# Patient Record
Sex: FEMALE | URBAN - METROPOLITAN AREA
[De-identification: names, ages, dates, MRNs, and addresses within clinical notes are randomized per-mention and may not be internally consistent; named-entity substitution may affect disease eponyms.]

---

## 2024-10-22 ENCOUNTER — APPOINTMENT (RX ONLY)
Dept: URBAN - METROPOLITAN AREA CLINIC 89 | Facility: CLINIC | Age: 34
Setting detail: DERMATOLOGY
End: 2024-10-22

## 2024-10-22 DIAGNOSIS — L40.0 PSORIASIS VULGARIS: ICD-10-CM

## 2024-10-22 DIAGNOSIS — L90.8 OTHER ATROPHIC DISORDERS OF SKIN: ICD-10-CM

## 2024-10-22 DIAGNOSIS — B36.0 PITYRIASIS VERSICOLOR: ICD-10-CM

## 2024-10-22 PROCEDURE — ? COSMETIC CONSULTATION - MICRO-NEEDLING

## 2024-10-22 PROCEDURE — ? OTC TREATMENT REGIMEN

## 2024-10-22 PROCEDURE — 99214 OFFICE O/P EST MOD 30 MIN: CPT

## 2024-10-22 PROCEDURE — ? ADDITIONAL NOTES

## 2024-10-22 PROCEDURE — ? TREATMENT REGIMEN

## 2024-10-22 PROCEDURE — ? PRESCRIPTION MEDICATION MANAGEMENT

## 2024-10-22 PROCEDURE — ? COUNSELING

## 2024-10-22 PROCEDURE — ? PRESCRIPTION

## 2024-10-22 PROCEDURE — ? TREMFYA MONITORING

## 2024-10-22 RX ORDER — KETOCONAZOLE 20 MG/G
% CREAM TOPICAL BID
Qty: 60 | Refills: 1 | Status: ERX | COMMUNITY
Start: 2024-10-22

## 2024-10-22 RX ADMIN — KETOCONAZOLE 1: 20 CREAM TOPICAL at 00:00

## 2024-10-22 ASSESSMENT — SEVERITY ASSESSMENT: SEVERITY: MILD TO MODERATE

## 2024-10-22 ASSESSMENT — LOCATION ZONE DERM
LOCATION ZONE: TRUNK
LOCATION ZONE: ARM

## 2024-10-22 ASSESSMENT — LOCATION SIMPLE DESCRIPTION DERM
LOCATION SIMPLE: LEFT UPPER BACK
LOCATION SIMPLE: LEFT SHOULDER

## 2024-10-22 ASSESSMENT — LOCATION DETAILED DESCRIPTION DERM
LOCATION DETAILED: LEFT SUPERIOR UPPER BACK
LOCATION DETAILED: LEFT ANTERIOR SHOULDER
LOCATION DETAILED: LEFT POSTERIOR SHOULDER

## 2024-10-22 ASSESSMENT — BSA RASH: BSA RASH: 5

## 2024-10-22 ASSESSMENT — PGA PSORIASIS: PGA PSORIASIS 2020: MILD

## 2024-10-22 ASSESSMENT — ITCH NUMERIC RATING SCALE: HOW SEVERE IS YOUR ITCHING?: 5

## 2024-10-22 ASSESSMENT — BSA PSORIASIS: % BODY COVERED IN PSORIASIS: 5

## 2024-10-22 NOTE — PROCEDURE: PRESCRIPTION MEDICATION MANAGEMENT
Modify Regimen: As needed:\\n\\nZoryve 0.03% QD\\nApply thin layer to affected areas on shoulders and back 1x daily.
Render In Strict Bullet Format?: No
Detail Level: Zone
Initiate Treatment: ketoconazole 2 % topical cream BID\\nSig: Apply a thin layer to affected areas on L shoulder 2x a day for 4 weeks

## 2024-10-22 NOTE — PROCEDURE: ADDITIONAL NOTES
Additional Notes: Patient was switched from Humira to Tremfya by Rheumatology.
Detail Level: Simple
Render Risk Assessment In Note?: no

## 2024-10-22 NOTE — PROCEDURE: TREMFYA MONITORING
Detail Level: Zone
Add High Risk Medication Management Associated Diagnosis?: No
Length Of Therapy: 1 month
Anesthesia Volume In Cc (Will Not Render If 0): 0.5

## 2024-12-18 ENCOUNTER — APPOINTMENT (OUTPATIENT)
Dept: URBAN - METROPOLITAN AREA CLINIC 89 | Facility: CLINIC | Age: 34
Setting detail: DERMATOLOGY
End: 2024-12-18

## 2024-12-18 DIAGNOSIS — B36.0 PITYRIASIS VERSICOLOR: ICD-10-CM | Status: RESOLVED

## 2024-12-18 DIAGNOSIS — L40.0 PSORIASIS VULGARIS: ICD-10-CM | Status: IMPROVED

## 2024-12-18 PROCEDURE — ? TREATMENT REGIMEN

## 2024-12-18 PROCEDURE — ? PRESCRIPTION MEDICATION MANAGEMENT

## 2024-12-18 PROCEDURE — 99214 OFFICE O/P EST MOD 30 MIN: CPT

## 2024-12-18 PROCEDURE — ? COUNSELING

## 2024-12-18 PROCEDURE — ? ADDITIONAL NOTES

## 2024-12-18 ASSESSMENT — BSA PSORIASIS: % BODY COVERED IN PSORIASIS: 0

## 2024-12-18 ASSESSMENT — LOCATION DETAILED DESCRIPTION DERM
LOCATION DETAILED: LEFT ANTERIOR SHOULDER
LOCATION DETAILED: LEFT POSTERIOR SHOULDER
LOCATION DETAILED: LEFT SUPERIOR UPPER BACK

## 2024-12-18 ASSESSMENT — PGA PSORIASIS: PGA PSORIASIS 2020: CLEAR

## 2024-12-18 ASSESSMENT — LOCATION SIMPLE DESCRIPTION DERM
LOCATION SIMPLE: LEFT SHOULDER
LOCATION SIMPLE: LEFT UPPER BACK

## 2024-12-18 ASSESSMENT — LOCATION ZONE DERM
LOCATION ZONE: ARM
LOCATION ZONE: TRUNK

## 2024-12-18 ASSESSMENT — ITCH NUMERIC RATING SCALE: HOW SEVERE IS YOUR ITCHING?: 0

## 2024-12-18 NOTE — PROCEDURE: PRESCRIPTION MEDICATION MANAGEMENT
Modify Regimen: As needed:\\n\\nZoryve 0.03% QD\\nApply thin layer to affected areas on shoulders and back 1x daily.
Render In Strict Bullet Format?: No
Detail Level: Zone
Modify Regimen: As needed:\\n\\nketoconazole 2 % topical cream BID\\nSig: Apply a thin layer to affected areas on L shoulder 2x a day for 4 weeks

## 2024-12-27 ENCOUNTER — APPOINTMENT (OUTPATIENT)
Dept: URBAN - METROPOLITAN AREA CLINIC 89 | Facility: CLINIC | Age: 34
Setting detail: DERMATOLOGY
End: 2024-12-27

## 2024-12-27 DIAGNOSIS — Q826 OTHER SPECIFIED ANOMALIES OF SKIN: ICD-10-CM

## 2024-12-27 DIAGNOSIS — Q819 OTHER SPECIFIED ANOMALIES OF SKIN: ICD-10-CM

## 2024-12-27 DIAGNOSIS — Q828 OTHER SPECIFIED ANOMALIES OF SKIN: ICD-10-CM

## 2024-12-27 PROBLEM — L85.8 OTHER SPECIFIED EPIDERMAL THICKENING: Status: ACTIVE | Noted: 2024-12-27

## 2024-12-27 PROCEDURE — ? PRESCRIPTION MEDICATION MANAGEMENT

## 2024-12-27 PROCEDURE — ? COUNSELING

## 2024-12-27 PROCEDURE — ? PRESCRIPTION

## 2024-12-27 PROCEDURE — 99214 OFFICE O/P EST MOD 30 MIN: CPT

## 2024-12-27 RX ORDER — BETAMETHASONE DIPROPIONATE 0.5 MG/ML
% LOTION TOPICAL BID
Qty: 60 | Refills: 2 | Status: ERX | COMMUNITY
Start: 2024-12-27

## 2024-12-27 RX ADMIN — BETAMETHASONE DIPROPIONATE %: 0.5 LOTION TOPICAL at 00:00

## 2024-12-27 ASSESSMENT — LOCATION SIMPLE DESCRIPTION DERM
LOCATION SIMPLE: LEFT BUTTOCK
LOCATION SIMPLE: LEFT BUTTOCK

## 2024-12-27 ASSESSMENT — SEVERITY ASSESSMENT: SEVERITY: MILD TO MODERATE

## 2024-12-27 ASSESSMENT — LOCATION ZONE DERM
LOCATION ZONE: TRUNK
LOCATION ZONE: TRUNK

## 2024-12-27 ASSESSMENT — LOCATION DETAILED DESCRIPTION DERM
LOCATION DETAILED: LEFT BUTTOCK
LOCATION DETAILED: LEFT BUTTOCK

## 2024-12-27 NOTE — PROCEDURE: PRESCRIPTION MEDICATION MANAGEMENT
Continue Regimen: AmLactin
Initiate Treatment: betamethasone dipropionate 0.05 % lotion BID-Sig: Apply to affected area on buttocks twice a day for two weeks and one week break
Render In Strict Bullet Format?: No
Detail Level: Zone
Plan: Recommend Accutane for better results. Patient is currently under laser treatment and would like to hold off until she completes laser treatment.

## 2024-12-27 NOTE — PROCEDURE: MIPS QUALITY
Quality 47: Advance Care Plan: Advance care planning not documented, reason not otherwise specified.
Quality 431: Preventive Care And Screening: Unhealthy Alcohol Use - Screening: Patient not identified as an unhealthy alcohol user when screened for unhealthy alcohol use using a systematic screening method
Quality 130: Documentation Of Current Medications In The Medical Record: Current Medications Documented
Detail Level: Detailed
Quality 226: Preventive Care And Screening: Tobacco Use: Screening And Cessation Intervention: Patient screened for tobacco use and is an ex/non-smoker
[id number]

## 2025-02-20 ENCOUNTER — APPOINTMENT (OUTPATIENT)
Dept: URBAN - METROPOLITAN AREA CLINIC 89 | Facility: CLINIC | Age: 35
Setting detail: DERMATOLOGY
End: 2025-02-20

## 2025-02-20 DIAGNOSIS — Q828 OTHER SPECIFIED ANOMALIES OF SKIN: ICD-10-CM

## 2025-02-20 DIAGNOSIS — K13.0 DISEASES OF LIPS: ICD-10-CM

## 2025-02-20 DIAGNOSIS — Q826 OTHER SPECIFIED ANOMALIES OF SKIN: ICD-10-CM

## 2025-02-20 DIAGNOSIS — L73.9 FOLLICULAR DISORDER, UNSPECIFIED: ICD-10-CM

## 2025-02-20 DIAGNOSIS — L40.8 OTHER PSORIASIS: ICD-10-CM

## 2025-02-20 DIAGNOSIS — Q819 OTHER SPECIFIED ANOMALIES OF SKIN: ICD-10-CM

## 2025-02-20 PROBLEM — L85.8 OTHER SPECIFIED EPIDERMAL THICKENING: Status: ACTIVE | Noted: 2025-02-20

## 2025-02-20 PROBLEM — L02.32 FURUNCLE OF BUTTOCK: Status: ACTIVE | Noted: 2025-02-20

## 2025-02-20 PROCEDURE — ? PRESCRIPTION

## 2025-02-20 PROCEDURE — 99213 OFFICE O/P EST LOW 20 MIN: CPT

## 2025-02-20 PROCEDURE — ? PRESCRIPTION MEDICATION MANAGEMENT

## 2025-02-20 PROCEDURE — ? TREATMENT REGIMEN

## 2025-02-20 PROCEDURE — ? COUNSELING

## 2025-02-20 RX ORDER — MUPIROCIN 20 MG/G
THIN LAYER OINTMENT TOPICAL BID
Qty: 22 | Refills: 0 | Status: ERX | COMMUNITY
Start: 2025-02-20

## 2025-02-20 RX ORDER — MOMETASONE FUROATE 1 MG/ML
ONE DROP SOLUTION TOPICAL QD
Qty: 60 | Refills: 0 | Status: ERX | COMMUNITY
Start: 2025-02-20

## 2025-02-20 RX ORDER — CLINDAMYCIN PHOSPHATE 10 MG/ML
THIN LAYER LOTION TOPICAL BID
Qty: 60 | Refills: 6 | Status: ERX | COMMUNITY
Start: 2025-02-20

## 2025-02-20 RX ADMIN — CLINDAMYCIN PHOSPHATE THIN LAYER: 10 LOTION TOPICAL at 00:00

## 2025-02-20 RX ADMIN — MUPIROCIN THIN LAYER: 20 OINTMENT TOPICAL at 00:00

## 2025-02-20 RX ADMIN — MOMETASONE FUROATE ONE DROP: 1 SOLUTION TOPICAL at 00:00

## 2025-02-20 ASSESSMENT — LOCATION DETAILED DESCRIPTION DERM
LOCATION DETAILED: LEFT DORSAL GREAT TOE
LOCATION DETAILED: COLUMELLA
LOCATION DETAILED: LEFT INDEX FINGERNAIL
LOCATION DETAILED: LEFT INDEX FINGERNAIL
LOCATION DETAILED: LEFT BUTTOCK
LOCATION DETAILED: RIGHT BUTTOCK

## 2025-02-20 ASSESSMENT — LOCATION ZONE DERM
LOCATION ZONE: FINGERNAIL
LOCATION ZONE: NOSE
LOCATION ZONE: TRUNK
LOCATION ZONE: TOE
LOCATION ZONE: FINGERNAIL

## 2025-02-20 ASSESSMENT — LOCATION SIMPLE DESCRIPTION DERM
LOCATION SIMPLE: LEFT INDEX FINGERNAIL
LOCATION SIMPLE: LEFT GREAT TOE
LOCATION SIMPLE: LEFT BUTTOCK
LOCATION SIMPLE: RIGHT BUTTOCK
LOCATION SIMPLE: NOSE
LOCATION SIMPLE: LEFT INDEX FINGERNAIL

## 2025-02-20 NOTE — PROCEDURE: PRESCRIPTION MEDICATION MANAGEMENT
Initiate Treatment: mometasone 0.1 % topical solution QD\\nQuantity: 60.0 ml  Days Supply: 30\\nSi drop to affected nails for 1 week
Detail Level: Zone
Continue Regimen: Zoryve after the 1 week of mometasone drops
Render In Strict Bullet Format?: No
Plan: Wait until full 16 weeks on Biologic. F/U in 8 weeks
Initiate Treatment: mupirocin 2 % topical ointment BID\\nQuantity: 22.0 g  Days Supply: 30\\nSig: Apply thin layer to affected areas 2 times daily for 2 weeks.
Initiate Treatment: clindamycin 1 % lotion BID\\nQuantity: 60.0 ml  Days Supply: 30\\nSig: Apply a thin layer to affected areas twice a day for 4 weeks, repeat as needed for flares

## 2025-05-15 ENCOUNTER — APPOINTMENT (OUTPATIENT)
Dept: URBAN - METROPOLITAN AREA CLINIC 89 | Facility: CLINIC | Age: 35
Setting detail: DERMATOLOGY
End: 2025-05-15

## 2025-05-15 DIAGNOSIS — L71.0 PERIORAL DERMATITIS: ICD-10-CM

## 2025-05-15 PROCEDURE — ? COUNSELING

## 2025-05-15 PROCEDURE — ? PRESCRIPTION

## 2025-05-15 PROCEDURE — 99213 OFFICE O/P EST LOW 20 MIN: CPT

## 2025-05-15 PROCEDURE — ? PRESCRIPTION MEDICATION MANAGEMENT

## 2025-05-15 PROCEDURE — ? DIAGNOSIS COMMENT

## 2025-05-15 PROCEDURE — ? PHOTO-DOCUMENTATION

## 2025-05-15 RX ORDER — TACROLIMUS 1 MG/G
OINTMENT TOPICAL QD
Qty: 60 | Refills: 1 | Status: ERX | COMMUNITY
Start: 2025-05-15

## 2025-05-15 RX ORDER — CLINDAMYCIN PHOSPHATE 10 MG/ML
LOTION TOPICAL BID
Qty: 60 | Refills: 1 | Status: ERX

## 2025-05-15 RX ADMIN — TACROLIMUS: 1 OINTMENT TOPICAL at 00:00

## 2025-05-15 ASSESSMENT — LOCATION SIMPLE DESCRIPTION DERM
LOCATION SIMPLE: LEFT LIP
LOCATION SIMPLE: LEFT NOSE

## 2025-05-15 ASSESSMENT — LOCATION ZONE DERM
LOCATION ZONE: LIP
LOCATION ZONE: NOSE

## 2025-05-15 ASSESSMENT — LOCATION DETAILED DESCRIPTION DERM
LOCATION DETAILED: LEFT NASAL ALA
LOCATION DETAILED: LEFT LOWER CUTANEOUS LIP

## 2025-05-15 NOTE — PROCEDURE: PRESCRIPTION MEDICATION MANAGEMENT
Detail Level: Zone
Render In Strict Bullet Format?: No
Initiate Treatment: clindamycin 1 % lotion BID\\nQuantity: 60.0 ml  Days Supply: 30\\nSig: Apply a thin layer to affected areas of perioral derm 1x a day for 4 weeks in the AM.\\n\\ntacrolimus 0.1 % topical ointment Qd\\nQuantity: 60.0 g  Days Supply: 30\\nSig: Apply a thin layer to affected areas of perioral derm once a day in the PM x 4 weeks

## 2025-05-15 NOTE — PROCEDURE: DIAGNOSIS COMMENT
Comment: Due to distribution: Ddx includes - Herpes Zoster
Render Risk Assessment In Note?: no
Detail Level: Simple

## 2025-06-11 ENCOUNTER — APPOINTMENT (OUTPATIENT)
Dept: URBAN - METROPOLITAN AREA CLINIC 89 | Facility: CLINIC | Age: 35
Setting detail: DERMATOLOGY
End: 2025-06-11

## 2025-06-11 DIAGNOSIS — L71.0 PERIORAL DERMATITIS: ICD-10-CM

## 2025-06-11 DIAGNOSIS — L40.0 PSORIASIS VULGARIS: ICD-10-CM | Status: WELL CONTROLLED

## 2025-06-11 PROCEDURE — ? PRESCRIPTION

## 2025-06-11 PROCEDURE — ? PRESCRIPTION MEDICATION MANAGEMENT

## 2025-06-11 PROCEDURE — ? DIAGNOSIS COMMENT

## 2025-06-11 PROCEDURE — ? COUNSELING

## 2025-06-11 RX ORDER — ROFLUMILAST 3 MG/G
CREAM TOPICAL QD
Qty: 60 | Refills: 3 | Status: ERX

## 2025-06-11 RX ORDER — BETAMETHASONE DIPROPIONATE 0.5 MG/G
CREAM TOPICAL
Qty: 45 | Refills: 0 | Status: ERX

## 2025-06-11 ASSESSMENT — LOCATION ZONE DERM
LOCATION ZONE: LIP
LOCATION ZONE: NOSE

## 2025-06-11 ASSESSMENT — LOCATION SIMPLE DESCRIPTION DERM
LOCATION SIMPLE: LEFT LIP
LOCATION SIMPLE: LEFT NOSE

## 2025-06-11 ASSESSMENT — LOCATION DETAILED DESCRIPTION DERM
LOCATION DETAILED: LEFT LOWER CUTANEOUS LIP
LOCATION DETAILED: LEFT NASAL ALA

## 2025-06-11 NOTE — PROCEDURE: PRESCRIPTION MEDICATION MANAGEMENT
Detail Level: Zone
Render In Strict Bullet Format?: No
Discontinue Regimen: clindamycin 1 % lotion BID\\nQuantity: 60.0 ml  Days Supply: 30\\nSig: Apply a thin layer to affected areas of perioral derm 1x a day for 4 weeks in the AM.\\n\\ntacrolimus 0.1 % topical ointment Qd\\nQuantity: 60.0 g  Days Supply: 30\\nSig: Apply a thin layer to affected areas of perioral derm once a day in the PM x 4 weeks
Initiate Treatment: Zoryve 0.3 % topical cream Qd\\nQuantity: 60.0 g  Days Supply: 30\\nSig: Apply a thin layer to affected areas of psoriasis ONCE a day x 8 weeks, then drop down to twice a week for maintenance, repeat for flares\\n\\nbetamethasone dipropionate 0.05 % topical cream \\nQuantity: 15.0 g  Days Supply: 30\\nSig: Apply to affected areas of psoriasis 2x a day for 2 weeks; repeat as needed